# Patient Record
Sex: MALE | Race: WHITE | URBAN - METROPOLITAN AREA
[De-identification: names, ages, dates, MRNs, and addresses within clinical notes are randomized per-mention and may not be internally consistent; named-entity substitution may affect disease eponyms.]

---

## 2020-08-07 ENCOUNTER — OFFICE VISIT (OUTPATIENT)
Dept: URGENT CARE | Age: 35
End: 2020-08-07
Payer: COMMERCIAL

## 2020-08-07 VITALS — OXYGEN SATURATION: 99 % | RESPIRATION RATE: 16 BRPM | HEART RATE: 85 BPM | TEMPERATURE: 98.8 F

## 2020-08-07 DIAGNOSIS — Z11.59 SCREENING FOR VIRAL DISEASE: Primary | ICD-10-CM

## 2020-08-07 PROCEDURE — 99202 OFFICE O/P NEW SF 15 MIN: CPT | Performed by: NURSE PRACTITIONER

## 2020-08-07 NOTE — PROGRESS NOTES
Subjective: (As above and below)       This patient was seen in [facility] at [address] [place] [address] [facility] while in their vehicle due to COVID-19 pandemic with PPE and focused examination in order to decrease community viral transmission. The patient/guardian gave verbal consent to treat. Chief Complaint   Patient presents with    Covid Testing     No sx, no COVID exposure. Pt states he is traveling for work and is required to have a COVID test prior     Anita Montanez is a 28 y.o. male who presents for COVID-19 testing  Required for: work  Currently has not tried any therapies and denies any symptoms at this point in time. Feels well otherwise. Recent travel: no  Known Exposure to COVID-19: no  Known flu or strep contact: no         ROS- negative for dizziness, cough, SOB, rhinorrhea, myalgias, n/v/d, rashes, headaches, fevers, chills, chest pains. Review of Systems - negative except as listed above    Reviewed PmHx, RxHx, FmHx, SocHx, AllgHx and updated in chart. Family History   Problem Relation Age of Onset    Diabetes Father      History reviewed. No pertinent past medical history. Social History     Socioeconomic History    Marital status:      Spouse name: Not on file    Number of children: Not on file    Years of education: Not on file    Highest education level: Not on file   Tobacco Use    Smoking status: Never Smoker    Smokeless tobacco: Never Used          No current outpatient medications on file. No current facility-administered medications for this visit. Objective:     Vitals:    08/07/20 0852   Pulse: 85   Resp: 16   Temp: 98.8 °F (37.1 °C)   SpO2: 99%       Physical Exam  General appearance - appears well hydrated and does not appear toxic, no acute distress  Eyes - EOMs intact. Non injected. No scleral icterus   Ears - no external swelling  Nose - No purulent drainage  Neck/Lymphatics - trachea midline, full AROM  Chest - normal breathing effort.  No active cough heard. No audible wheezing. Heart - HR-see vitals  Skin - no observable rashes or pallor  Neurologic- alert and oriented x 3  Psychiatric- normal mood, behavior and though content. Assessment/ Plan:     1. Screening for viral disease    - NOVEL CORONAVIRUS (COVID-19)      Will test for COVID  Supportive home care reviewed for any development of mild symptoms - tylenol, maintain adequate fluid intake, deep breathing exercises  Self isolate/quarantine advised based on symptoms/lab results as recommended in current CDC guidelines. Follow up: We have reviewed, in detail, particular presentations/worsening/concerning signs and symptoms that may warrant seeking immediate care in the ED setting and patient verbalized being aware of what to watch for. For other non-severe changes, non-improvement or questions, patient aware to contact PCP office or consider a virtual online medical consultation. Reviewed with him that COVID-19 pandemic is an evolving situation with rapidly changing recommendations & guidelines. Medical decisions are made based on the the best information available at the time.   Recommended he stay tuned for updates published by trusted sources and to advise your PCP of any unexpected changes in clinical condition     Vishal Varela, TAHIRA

## 2020-08-09 LAB — SARS-COV-2, NAA: NOT DETECTED
